# Patient Record
Sex: FEMALE | Race: BLACK OR AFRICAN AMERICAN | NOT HISPANIC OR LATINO | URBAN - METROPOLITAN AREA
[De-identification: names, ages, dates, MRNs, and addresses within clinical notes are randomized per-mention and may not be internally consistent; named-entity substitution may affect disease eponyms.]

---

## 2022-08-19 ENCOUNTER — EMERGENCY (EMERGENCY)
Facility: HOSPITAL | Age: 57
LOS: 1 days | Discharge: HOME | End: 2022-08-19
Attending: EMERGENCY MEDICINE | Admitting: EMERGENCY MEDICINE

## 2022-08-19 VITALS
OXYGEN SATURATION: 96 % | DIASTOLIC BLOOD PRESSURE: 63 MMHG | SYSTOLIC BLOOD PRESSURE: 135 MMHG | TEMPERATURE: 98 F | HEART RATE: 110 BPM | RESPIRATION RATE: 18 BRPM

## 2022-08-19 PROCEDURE — 99285 EMERGENCY DEPT VISIT HI MDM: CPT

## 2022-08-19 NOTE — ED PROVIDER NOTE - PATIENT PORTAL LINK FT
You can access the FollowMyHealth Patient Portal offered by [facility] by registering at the following website: [email or web address]. By joining [employer]’s FollowMyHealth portal, you will also be able to view your health information using other applications (apps) compatible with our system.

## 2022-08-19 NOTE — ED PROVIDER NOTE - PROGRESS NOTE DETAILS
Valentino QUINTERO: Pt signed out to me.  CTA neg for pe.  She is HD stable, comfortable, nontoxic appearing.  LL o pacity noted on CT scan - she does report fever and chills at home.  Tested neg for covid today.  Will start oral abx, dc home to cont course and outpatient pmd follow-up.

## 2022-08-19 NOTE — ED ADULT NURSE NOTE - OBJECTIVE STATEMENT
Patient has c/o pain in her right rib to her back when she breathes. It comes and goes. Pt went to urgent care and was told that she had one leg larger than the other.  PT A&OX4.  No distress noted.  Denies CP, no SOB noted.  Breathing non-labored.  PT ambulatory, able to move all extremities.  Skin intact.  Labs sent as ordered.  Right 20G IVL in place and tolerating well. CT Scan and BLE doppler ordered.  Will continue to monitor.

## 2022-08-19 NOTE — ED PROVIDER NOTE - CLINICAL SUMMARY MEDICAL DECISION MAKING FREE TEXT BOX
Pt is a 57 y/o female p/w pleuritic chest pain and slight swelling to LE.  Will order CTA to r/o PE, and U/S to r/o DVT.  Will order labs including CBC, CMP, and troponin.  Will reassess.

## 2022-08-19 NOTE — ED PROVIDER NOTE - NSFOLLOWUPINSTRUCTIONS_ED_ALL_ED_FT
You have pneumonia.  You were started on antibiotics.  Continue to take azithromycin 250mg once a day for the next 4 days.    Drink plenty of fluids.  You can take ibuprofen 600mg every 6 hours or Tylenol 650mg every 4 hours as needed for pain or fever.  Follow-up with your PMD within 1 week.  Return to the emergency department for any new or worsening symptoms.

## 2022-08-19 NOTE — ED PROVIDER NOTE - OBJECTIVE STATEMENT
Pt is a 57 y/o female w/ a PMH significant for HTN p/w chest pain for last 3 days. States pain is localized to right lower chest, associated with breathing, intermittent in nature. Denies SOB, nausea, diaphoresis, fever, cough. Report no similar prior episodes.  Of note, Pt admits to going to Urgentcare prior to this ED admission; Provider at Urgentcare saw leg was asymmetrically swollen and Pt w/ tachycardia, referred Pt for this visit to r/o PE.  Pt denies recent travel.

## 2022-08-19 NOTE — ED PROVIDER NOTE - NS_ ATTENDINGSCRIBEDETAILS _ED_A_ED_FT
The scribe's documentation has been prepared under my direction and personally reviewed by me in its entirety. I confirm that the note above accurately reflects all work, treatment, procedures, and medical decision making performed by me (Dr. Cade).

## 2022-08-19 NOTE — ED PROVIDER NOTE - CPE EDP CARDIAC NORM
Spoke with [employer], they are still reviewing free drug application. They are verifying insurance denials. They have everything they need to do this and should have a determination within a week.   - - -

## 2022-08-19 NOTE — ED ADULT TRIAGE NOTE - CHIEF COMPLAINT QUOTE
Patient has c/o pain in her right rib to her back when she breathes. It comes and goes. Pt went to urgent care and was told that she had one leg larger than the other.

## 2022-08-20 VITALS
DIASTOLIC BLOOD PRESSURE: 66 MMHG | RESPIRATION RATE: 16 BRPM | OXYGEN SATURATION: 96 % | SYSTOLIC BLOOD PRESSURE: 130 MMHG | HEART RATE: 92 BPM | TEMPERATURE: 99 F

## 2022-08-20 LAB
ALBUMIN SERPL ELPH-MCNC: 3.9 G/DL — SIGNIFICANT CHANGE UP (ref 3.3–5)
ALP SERPL-CCNC: 113 U/L — SIGNIFICANT CHANGE UP (ref 40–120)
ALT FLD-CCNC: 20 U/L — SIGNIFICANT CHANGE UP (ref 4–33)
ANION GAP SERPL CALC-SCNC: 13 MMOL/L — SIGNIFICANT CHANGE UP (ref 7–14)
AST SERPL-CCNC: 18 U/L — SIGNIFICANT CHANGE UP (ref 4–32)
BASOPHILS # BLD AUTO: 0.04 K/UL — SIGNIFICANT CHANGE UP (ref 0–0.2)
BASOPHILS NFR BLD AUTO: 0.5 % — SIGNIFICANT CHANGE UP (ref 0–2)
BILIRUB SERPL-MCNC: 0.3 MG/DL — SIGNIFICANT CHANGE UP (ref 0.2–1.2)
BUN SERPL-MCNC: 10 MG/DL — SIGNIFICANT CHANGE UP (ref 7–23)
CALCIUM SERPL-MCNC: 9.2 MG/DL — SIGNIFICANT CHANGE UP (ref 8.4–10.5)
CHLORIDE SERPL-SCNC: 96 MMOL/L — LOW (ref 98–107)
CO2 SERPL-SCNC: 27 MMOL/L — SIGNIFICANT CHANGE UP (ref 22–31)
CREAT SERPL-MCNC: 0.96 MG/DL — SIGNIFICANT CHANGE UP (ref 0.5–1.3)
EGFR: 69 ML/MIN/1.73M2 — SIGNIFICANT CHANGE UP
EOSINOPHIL # BLD AUTO: 0.13 K/UL — SIGNIFICANT CHANGE UP (ref 0–0.5)
EOSINOPHIL NFR BLD AUTO: 1.7 % — SIGNIFICANT CHANGE UP (ref 0–6)
GLUCOSE SERPL-MCNC: 101 MG/DL — HIGH (ref 70–99)
HCT VFR BLD CALC: 36.5 % — SIGNIFICANT CHANGE UP (ref 34.5–45)
HGB BLD-MCNC: 11.1 G/DL — LOW (ref 11.5–15.5)
IANC: 5.22 K/UL — SIGNIFICANT CHANGE UP (ref 1.8–7.4)
IMM GRANULOCYTES NFR BLD AUTO: 0.4 % — SIGNIFICANT CHANGE UP (ref 0–1.5)
LYMPHOCYTES # BLD AUTO: 1.65 K/UL — SIGNIFICANT CHANGE UP (ref 1–3.3)
LYMPHOCYTES # BLD AUTO: 21 % — SIGNIFICANT CHANGE UP (ref 13–44)
MCHC RBC-ENTMCNC: 26.2 PG — LOW (ref 27–34)
MCHC RBC-ENTMCNC: 30.4 GM/DL — LOW (ref 32–36)
MCV RBC AUTO: 86.1 FL — SIGNIFICANT CHANGE UP (ref 80–100)
MONOCYTES # BLD AUTO: 0.78 K/UL — SIGNIFICANT CHANGE UP (ref 0–0.9)
MONOCYTES NFR BLD AUTO: 9.9 % — SIGNIFICANT CHANGE UP (ref 2–14)
NEUTROPHILS # BLD AUTO: 5.22 K/UL — SIGNIFICANT CHANGE UP (ref 1.8–7.4)
NEUTROPHILS NFR BLD AUTO: 66.5 % — SIGNIFICANT CHANGE UP (ref 43–77)
NRBC # BLD: 0 /100 WBCS — SIGNIFICANT CHANGE UP (ref 0–0)
NRBC # FLD: 0 K/UL — SIGNIFICANT CHANGE UP (ref 0–0)
PLATELET # BLD AUTO: 351 K/UL — SIGNIFICANT CHANGE UP (ref 150–400)
POTASSIUM SERPL-MCNC: 3.1 MMOL/L — LOW (ref 3.5–5.3)
POTASSIUM SERPL-SCNC: 3.1 MMOL/L — LOW (ref 3.5–5.3)
PROT SERPL-MCNC: 7.9 G/DL — SIGNIFICANT CHANGE UP (ref 6–8.3)
RBC # BLD: 4.24 M/UL — SIGNIFICANT CHANGE UP (ref 3.8–5.2)
RBC # FLD: 17.2 % — HIGH (ref 10.3–14.5)
SODIUM SERPL-SCNC: 136 MMOL/L — SIGNIFICANT CHANGE UP (ref 135–145)
TROPONIN T, HIGH SENSITIVITY RESULT: <6 NG/L — SIGNIFICANT CHANGE UP
WBC # BLD: 7.85 K/UL — SIGNIFICANT CHANGE UP (ref 3.8–10.5)
WBC # FLD AUTO: 7.85 K/UL — SIGNIFICANT CHANGE UP (ref 3.8–10.5)

## 2022-08-20 PROCEDURE — 93971 EXTREMITY STUDY: CPT | Mod: 26,LT

## 2022-08-20 PROCEDURE — 71275 CT ANGIOGRAPHY CHEST: CPT | Mod: 26,MA

## 2022-08-20 RX ORDER — POTASSIUM CHLORIDE 20 MEQ
40 PACKET (EA) ORAL ONCE
Refills: 0 | Status: COMPLETED | OUTPATIENT
Start: 2022-08-20 | End: 2022-08-20

## 2022-08-20 RX ORDER — AZITHROMYCIN 500 MG/1
1 TABLET, FILM COATED ORAL
Qty: 4 | Refills: 0
Start: 2022-08-20 | End: 2022-08-23

## 2022-08-20 RX ORDER — AZITHROMYCIN 500 MG/1
500 TABLET, FILM COATED ORAL ONCE
Refills: 0 | Status: COMPLETED | OUTPATIENT
Start: 2022-08-20 | End: 2022-08-20

## 2022-08-20 RX ADMIN — Medication 40 MILLIEQUIVALENT(S): at 01:35

## 2022-08-20 RX ADMIN — AZITHROMYCIN 500 MILLIGRAM(S): 500 TABLET, FILM COATED ORAL at 04:45

## 2023-03-04 ENCOUNTER — INPATIENT (INPATIENT)
Facility: HOSPITAL | Age: 58
LOS: 0 days | Discharge: HOME | End: 2023-03-05
Attending: INTERNAL MEDICINE | Admitting: INTERNAL MEDICINE
Payer: COMMERCIAL

## 2023-03-04 VITALS
TEMPERATURE: 98 F | HEART RATE: 89 BPM | SYSTOLIC BLOOD PRESSURE: 200 MMHG | OXYGEN SATURATION: 98 % | DIASTOLIC BLOOD PRESSURE: 117 MMHG | RESPIRATION RATE: 25 BRPM

## 2023-03-04 LAB
ALBUMIN SERPL ELPH-MCNC: 4.1 G/DL — SIGNIFICANT CHANGE UP (ref 3.3–5)
ALP SERPL-CCNC: 101 U/L — SIGNIFICANT CHANGE UP (ref 40–120)
ALT FLD-CCNC: 18 U/L — SIGNIFICANT CHANGE UP (ref 4–33)
ANION GAP SERPL CALC-SCNC: 12 MMOL/L — SIGNIFICANT CHANGE UP (ref 7–14)
AST SERPL-CCNC: 15 U/L — SIGNIFICANT CHANGE UP (ref 4–32)
BASOPHILS # BLD AUTO: 0.02 K/UL — SIGNIFICANT CHANGE UP (ref 0–0.2)
BASOPHILS NFR BLD AUTO: 0.4 % — SIGNIFICANT CHANGE UP (ref 0–2)
BILIRUB SERPL-MCNC: <0.2 MG/DL — SIGNIFICANT CHANGE UP (ref 0.2–1.2)
BUN SERPL-MCNC: 18 MG/DL — SIGNIFICANT CHANGE UP (ref 7–23)
CALCIUM SERPL-MCNC: 9.5 MG/DL — SIGNIFICANT CHANGE UP (ref 8.4–10.5)
CHLORIDE SERPL-SCNC: 102 MMOL/L — SIGNIFICANT CHANGE UP (ref 98–107)
CO2 SERPL-SCNC: 26 MMOL/L — SIGNIFICANT CHANGE UP (ref 22–31)
CREAT SERPL-MCNC: 0.94 MG/DL — SIGNIFICANT CHANGE UP (ref 0.5–1.3)
EGFR: 71 ML/MIN/1.73M2 — SIGNIFICANT CHANGE UP
EOSINOPHIL # BLD AUTO: 0.11 K/UL — SIGNIFICANT CHANGE UP (ref 0–0.5)
EOSINOPHIL NFR BLD AUTO: 2 % — SIGNIFICANT CHANGE UP (ref 0–6)
GLUCOSE SERPL-MCNC: 98 MG/DL — SIGNIFICANT CHANGE UP (ref 70–99)
HCT VFR BLD CALC: 40.6 % — SIGNIFICANT CHANGE UP (ref 34.5–45)
HGB BLD-MCNC: 12.4 G/DL — SIGNIFICANT CHANGE UP (ref 11.5–15.5)
IANC: 2.96 K/UL — SIGNIFICANT CHANGE UP (ref 1.8–7.4)
IMM GRANULOCYTES NFR BLD AUTO: 0.2 % — SIGNIFICANT CHANGE UP (ref 0–0.9)
LYMPHOCYTES # BLD AUTO: 2.07 K/UL — SIGNIFICANT CHANGE UP (ref 1–3.3)
LYMPHOCYTES # BLD AUTO: 37.4 % — SIGNIFICANT CHANGE UP (ref 13–44)
MAGNESIUM SERPL-MCNC: 2.1 MG/DL — SIGNIFICANT CHANGE UP (ref 1.6–2.6)
MCHC RBC-ENTMCNC: 26.7 PG — LOW (ref 27–34)
MCHC RBC-ENTMCNC: 30.5 GM/DL — LOW (ref 32–36)
MCV RBC AUTO: 87.3 FL — SIGNIFICANT CHANGE UP (ref 80–100)
MONOCYTES # BLD AUTO: 0.36 K/UL — SIGNIFICANT CHANGE UP (ref 0–0.9)
MONOCYTES NFR BLD AUTO: 6.5 % — SIGNIFICANT CHANGE UP (ref 2–14)
NEUTROPHILS # BLD AUTO: 2.96 K/UL — SIGNIFICANT CHANGE UP (ref 1.8–7.4)
NEUTROPHILS NFR BLD AUTO: 53.5 % — SIGNIFICANT CHANGE UP (ref 43–77)
NRBC # BLD: 0 /100 WBCS — SIGNIFICANT CHANGE UP (ref 0–0)
NRBC # FLD: 0 K/UL — SIGNIFICANT CHANGE UP (ref 0–0)
NT-PROBNP SERPL-SCNC: 74 PG/ML — SIGNIFICANT CHANGE UP
PLATELET # BLD AUTO: 328 K/UL — SIGNIFICANT CHANGE UP (ref 150–400)
POTASSIUM SERPL-MCNC: 3.4 MMOL/L — LOW (ref 3.5–5.3)
POTASSIUM SERPL-SCNC: 3.4 MMOL/L — LOW (ref 3.5–5.3)
PROT SERPL-MCNC: 8.2 G/DL — SIGNIFICANT CHANGE UP (ref 6–8.3)
RBC # BLD: 4.65 M/UL — SIGNIFICANT CHANGE UP (ref 3.8–5.2)
RBC # FLD: 16.4 % — HIGH (ref 10.3–14.5)
SODIUM SERPL-SCNC: 140 MMOL/L — SIGNIFICANT CHANGE UP (ref 135–145)
TROPONIN T, HIGH SENSITIVITY RESULT: <6 NG/L — SIGNIFICANT CHANGE UP
WBC # BLD: 5.53 K/UL — SIGNIFICANT CHANGE UP (ref 3.8–10.5)
WBC # FLD AUTO: 5.53 K/UL — SIGNIFICANT CHANGE UP (ref 3.8–10.5)

## 2023-03-04 PROCEDURE — 71045 X-RAY EXAM CHEST 1 VIEW: CPT | Mod: 26

## 2023-03-04 PROCEDURE — 99285 EMERGENCY DEPT VISIT HI MDM: CPT

## 2023-03-04 RX ORDER — NIFEDIPINE 30 MG
30 TABLET, EXTENDED RELEASE 24 HR ORAL ONCE
Refills: 0 | Status: COMPLETED | OUTPATIENT
Start: 2023-03-04 | End: 2023-03-04

## 2023-03-04 RX ORDER — ASPIRIN/CALCIUM CARB/MAGNESIUM 324 MG
162 TABLET ORAL ONCE
Refills: 0 | Status: COMPLETED | OUTPATIENT
Start: 2023-03-04 | End: 2023-03-04

## 2023-03-04 RX ADMIN — Medication 162 MILLIGRAM(S): at 22:18

## 2023-03-04 RX ADMIN — Medication 30 MILLIGRAM(S): at 22:19

## 2023-03-04 NOTE — ED ADULT TRIAGE NOTE - CHIEF COMPLAINT QUOTE
alert oriented c/o headache CP palpitations had E[facility]  Blood Pressure 200/117  PMHx HTN high cholesterol

## 2023-03-04 NOTE — ED PROVIDER NOTE - OBJECTIVE STATEMENT
Patient is a 57 year-old-female with history of HTN presents with chest pain and shortness of breath. Patient states that around 4pm when she started working, she noticed some tightness in the midsternal area with some associated shortness of breath and headache. Went to urgent care and found to have elevated BP and thus presented here. Currently reports that her symptoms has resolved, not currently having any chest pain or shortness of breath. Denies fever at home, nausea, vomiting, abdominal pain, urinary or bowel complaints.

## 2023-03-04 NOTE — ED PROVIDER NOTE - CLINICAL SUMMARY MEDICAL DECISION MAKING FREE TEXT BOX
Patient is a 57 year-old-female with history of HTN presents with chest pain and shortness of breath. Currently asymptomatic. Patient is currently afebrile and hemodynamically stable. BP elevated to 200/117. Concerning for hypertensive urgency vs ACS. Patient does not have a cardiologist and never had cardiac workup. Will obtain labs including trop/bnp, ECG and CXR. Will give aspirin and home nifidepine. Likely require admission for further management.

## 2023-03-04 NOTE — ED PROVIDER NOTE - ATTENDING CONTRIBUTION TO CARE
Deven Montes DO:  patient seen and evaluated with the resident.  I was present for key portions of the History & Physical, and I agree with the Impression & Plan. 56 yo f pmh htn, pw cp. Reports 1 day of symptoms.  States has anterior chest pressure, exertional, resolved prior to arrival.  Has had symptoms in the past.  No recent cardiac work-up.  Also PLAZA.  Denies leg swelling.  Denies recent travel.  Denies N/V, diaphoresis.  Patient arrives HDS, well-appearing.  BP elevated.  Will control BP, assess for ACS.  Low threshold for CDU for echo.

## 2023-03-04 NOTE — CONSULT NOTE ADULT - ASSESSMENT
57 year old female, with past history significant for Hypertension, and Smoking, presented to the ED secondary to chest pain and shortness of breath.  Diagnosed with Chest Pain, Shortness of breath, Headache and Elevated Blood Pressure Reading in the ED.    Problem/Plan - 1:  ·  Problem: Chest tightness.   ·  Plan: - sudden onset  & w/ shortness of breath, lightheadedness, w/ report of significantly elevated BP (BP = 200/117 in ED)  - history of smoking; quit prior to 2013.  FH of multiple members w/ HTN  - in the ED in 08/2022 for chest pain and negative findings for PE (CTA and US)  - likelihood that sleep deprivation, inadequate rest is impacting current condition   - troponin = < 6 --> < 6.  Pro-BNP = 74 (pro-BNP may be falsely low in the context of obesity)  - initial ECG not found in chart; will reorder (please f/u)  - CXR w/o any acute untoward findings  - s/p aspirin 162 mg and nifedipine XL 30 mg in the ED  - continuing w/ PTA nifedipine 30 mg, valsartan 160 mg.  Rescheduling HCTZ for tomorrow AM due to dehydration   - adding atorvastatin 20 mg, aspirin 81 mg, metoprolol 25 mg  - f/u TTE   -Ischemic Work-up NST      Problem/Plan - 2:  ·  Problem: Hypertensive urgency.   ·  Plan: - BP initially = 200/117  - Troponin = <6 --> < 6.  Pro-BNP = 74.  No signs of renal compromise.  No mental status changes  - medications as above  - modify therapy, as needed  - continues on Telemetry.    Problem/Plan - 3:  ·  Problem: Dehydration.   ·  Plan: - dry oral mucosa  - although pro-BNP = 74, in the context of obesity, unlikely falsely low  - prescribed LR one liter over 2 hours  - f/u electrolytes  - evaluate for improvement  - oral hydration, as tolerated.    Problem/Plan - 4:  ·  Problem: Hypokalemia.   ·  Plan: - potassium = 3.4  (magnesium w/in acceptable range at 2.10)  - supplemented w/ 40 meq, followed by 20 meq after 4 hours  - f/u for resolution.    Problem/Plan - 5:  ·  Problem: Tiredness.   ·  Plan: - very tired, per patient, in the context of working 2 jobs (full time)  - reports sleep deprivation due to same  - likely contributing to current medical condition  - f/u TSH level  - needs to modify schedule as much as possible to achieve adequate sleep/rest.    Problem/Plan - 6:  ·  Problem: Prophylactic measure.   ·  Plan: - ambulatory  - on aspirin  - if becomes recumbent, would start pharmacological DVT prophylaxis.

## 2023-03-04 NOTE — ED ADULT NURSE REASSESSMENT NOTE - NS ED NURSE REASSESS COMMENT FT1
repeat BP improved. pt without complaints. rsr on monitor without ectopy.  will continue to monitor.

## 2023-03-04 NOTE — CONSULT NOTE ADULT - SUBJECTIVE AND OBJECTIVE BOX
PATIENT SEEN AND EXAMINED ON :-03/04/2023  DATE OF SERVICE:   03/04/2023          Interim events noted,Labs ,Radiological studies and Cardiology tests reviewed .    History of Present Illness:  Reason for Admission: Chest pain  History of Present Illness:   57 year old female, with past history significant for Hypertension, and Smoking, presented to the ED secondary to chest pain and shortness of breath.  Seen and evaluated at bedside; lying right decubitus in bed in NAD.  Appears tired.  Patient endorses experiencing significant midsternal chest heaviness and shortness of breath (had to incorporate extra effort to breathe) at around 4:00 PM, prior to starting work.  Per documentation, patient went to the urgent care center and evaluation noted elevated blood pressure so patient was sent to the ED.  Patient complains of sf significant tiredness - related to working two (2) jobs.  No report of edema,  Has lightheadedness, headache (sometimes at the occiput and at other times frontal - involving the eyes).  Relates nausea, possibly related to having not eaten until ~ 2:00 PM on the day of coming to the ED.  Comments on inadequate sleep/rest due to working 2 full time jobs.    Vital signs upon ED presentation as follows: BP = 200/117, HR = 89, RR = 25, T = 36.7 C (98 F), O2 Sat = 98% on RA.  Diagnosed with Chest Pain, Shortness of breath, Headache and Elevated Blood Pressure Reading in the ED.    Review of Systems:  Review of Systems: REVIEW OF SYSTEMS:    CONSTITUTIONAL: Significant tiredness.  (+) headache (occipital and frontal at times).  No weakness, fever, chills or sweating  EYES/ENT: No visual changes.  No dysphagia  NECK: No pain or stiffness  RESPIRATORY: No cough or hemoptysis.  (+) shortness of breath  CARDIOVASCULAR: Midsternal chest tightness.   No palpitations.  No lower extremity edema  GASTROINTESTINAL: No epigastric or abdominal pain. No nausea, vomiting or hematemesis.  No diarrhea or constipation. No melena or hematochezia.  GENITOURINARY: No dysuria, frequency or hematuria  MUSCULOSKELETAL: No joint pain, swelling, decreased ROM, erythema, warmth  NEUROLOGICAL: No numbness or weakness  PSYCHIATRY: Sleep deprivation.  No anxiety, or depression.  SKIN: No itching, burning, rashes, or lesions   All other review of systems is negative unless indicated above.      Allergies and Intolerances:        Allergies:  	No Known Allergies:     Home Medications:   * Patient Currently Takes Medications as of 20-Aug-2022 04:36 documented in Structured Notes  · 	azithromycin 250 mg oral tablet: 1 tab(s) orally once a day     Patient History:   Past Medical, Past Surgical, and Family History:  PAST MEDICAL HISTORY:  History of smoking     HTN (hypertension).     PAST SURGICAL HISTORY:  No significant past surgical history.     FAMILY HISTORY:  Mother  Still living? Unknown  Family history of diabetes mellitus (DM), Age at diagnosis: Age Unknown  Family history of hypertension, Age at diagnosis: Age Unknown    Sibling  Still living? Unknown  Family history of hypertension, Age at diagnosis: Age Unknown.        Physical Exam: Vital Signs Last 24 Hrs  T(C): 36.7 (04 Mar 2023 20:52), Max: 36.7 (04 Mar 2023 20:52)  T(F): 98 (04 Mar 2023 20:52), Max: 98 (04 Mar 2023 20:52)  HR: 72 (05 Mar 2023 00:15) (72 - 89)  BP: 149/83 (05 Mar 2023 00:15) (143/93 - 200/117)  BP(mean): --  RR: 17 (05 Mar 2023 00:15) (16 - 25)  SpO2: 100% (05 Mar 2023 00:15) (97% - 100%)    Parameters below as of 05 Mar 2023 00:15  Patient On (Oxygen Delivery Method): room air      PHYSICAL EXAM:  General: No acute distress BMI-34  HEENT: EOMI, PERRL  Neck: Supple, [ ] JVD  Lungs: Equal air entry bilaterally; [ ] rales [ ] wheezing [ ] rhonchi  Heart: Regular rate and rhythm; [x ] murmur   2/6 [ x] systolic [ ] diastolic [ ] radiation[ ] rubs [ ]  gallops  Abdomen: Nontender, bowel sounds present  Extremities: No clubbing, cyanosis, [ ] edema [ ]Pulses  equal and intact  Nervous system:  Alert & Oriented X3, no focal deficits  Psychiatric: Normal affect  Skin: No rashes or lesions        Labs and Results:  Labs, Radiology, Cardiology, and Other Results: LAB-WORK/STUDIES:                          12.4   5.53  )-----------( 328      ( 04 Mar 2023 22:00 )             40.6     04 Mar 2023 22:00    140    |  102    |  18     ----------------------------<  98     3.4     |  26     |  0.94     Ca    9.5        04 Mar 2023 22:00  Mg     2.10      04 Mar 2023 22:00    TPro  8.2    /  Alb  4.1    /  TBili  <0.2   /  DBili  x      /  AST  15     /  ALT  18     /  AlkPhos  101    04 Mar 2023 22:00    LIVER FUNCTIONS - ( 04 Mar 2023 22:00 )  Alb: 4.1 g/dL / Pro: 8.2 g/dL / ALK PHOS: 101 U/L / ALT: 18 U/L / AST: 15 U/L / GGT: x          ECG:  Normal sinus rhythm at 75 BPM  Possible Left atrial enlargement  Left ventricular hypertrophy  Abnormal ECG    Xray Chest 1 View- PORTABLE-Urgent (03.04.23 @ 22:51) >  FINDINGS:  The heart is normal in size.  The lungs are clear.  There is no pneumothorax or pleural effusion.    IMPRESSION:  Clear lungs.

## 2023-03-04 NOTE — ED PROVIDER NOTE - IV ALTEPLASE ADMIN OUTSIDE HIDDEN
Patient informed of negative covid and to continue quarantine as directed. Verbalized understanding.   
show

## 2023-03-04 NOTE — CONSULT NOTE ADULT - TIME BILLING
- Review of records, telemetry, vital signs and daily labs.   - General and cardiovascular physical examination.  - Generation of cardiovascular treatment plan.  - Coordination of care.  Attending supervision statement: I have personally seen and examined the patient.    I reviewed the overnight course of events on the unit, re-confirming the patient history.  I have reciewed interim Lab results,Radiology and Cardiac studies.   I discussed the care with the patient and availavle  family.   Differential diagnosis and plan of care discussed with patient after the evaluation.   Advanced care planning was discussed .    Advanced care planning forms were reviewed and discussed.    Risks, benefits and alternatives of scheduled procedures were discussed in detail and all questions were answered.   35 minutes spent on total encounter of which more than fifty percent of the encounter was spent counseling and/or coordinating care by the attending physician.  [facility] Code [id number]             Patient was seen and examined by me on 03/04/2023,interim events noted,labs and radiology studies reviewed.  Braxton Perry MD,FACC.  [address] [place], [place] [id number].  [phone]

## 2023-03-04 NOTE — ED ADULT NURSE NOTE - OBJECTIVE STATEMENT
Pt presents to ED c/o having an episode of CP / pressure w SOB. Went to urgent care and was referred to ED. Is compliant w her anti HTN meds- procardia XL. No n/v. fever, chills or other complaints. Has noted CP w exertion recently. respirations even and unlabored. skin warm and dry. rsr on monitor, no ectopy. BP improved since triage. iv placed, labs drawn and sent, 20 ga left ac. meds given as per orders. will continue to monitor awaiting results.

## 2023-03-04 NOTE — ED PROVIDER NOTE - CARE PLAN
Principal Discharge DX:	Chest pain  Secondary Diagnosis:	SOB (shortness of breath)  Secondary Diagnosis:	Headache  Secondary Diagnosis:	Elevated blood pressure reading   1

## 2023-03-05 VITALS
RESPIRATION RATE: 20 BRPM | OXYGEN SATURATION: 97 % | TEMPERATURE: 98 F | DIASTOLIC BLOOD PRESSURE: 53 MMHG | HEART RATE: 79 BPM | SYSTOLIC BLOOD PRESSURE: 101 MMHG

## 2023-03-05 DIAGNOSIS — R07.89 OTHER CHEST PAIN: ICD-10-CM

## 2023-03-05 DIAGNOSIS — R07.9 CHEST PAIN, UNSPECIFIED: ICD-10-CM

## 2023-03-05 DIAGNOSIS — E86.0 DEHYDRATION: ICD-10-CM

## 2023-03-05 DIAGNOSIS — I16.0 HYPERTENSIVE URGENCY: ICD-10-CM

## 2023-03-05 DIAGNOSIS — Z29.9 ENCOUNTER FOR PROPHYLACTIC MEASURES, UNSPECIFIED: ICD-10-CM

## 2023-03-05 DIAGNOSIS — E87.6 HYPOKALEMIA: ICD-10-CM

## 2023-03-05 DIAGNOSIS — R53.83 OTHER FATIGUE: ICD-10-CM

## 2023-03-05 PROBLEM — I10 ESSENTIAL (PRIMARY) HYPERTENSION: Chronic | Status: ACTIVE | Noted: 2022-08-20

## 2023-03-05 LAB
24R-OH-CALCIDIOL SERPL-MCNC: 11.6 NG/ML — LOW (ref 30–80)
A1C WITH ESTIMATED AVERAGE GLUCOSE RESULT: 5.9 % — HIGH (ref 4–5.6)
ANION GAP SERPL CALC-SCNC: 13 MMOL/L — SIGNIFICANT CHANGE UP (ref 7–14)
B PERT DNA SPEC QL NAA+PROBE: SIGNIFICANT CHANGE UP
B PERT+PARAPERT DNA PNL SPEC NAA+PROBE: SIGNIFICANT CHANGE UP
BORDETELLA PARAPERTUSSIS (RAPRVP): SIGNIFICANT CHANGE UP
BUN SERPL-MCNC: 14 MG/DL — SIGNIFICANT CHANGE UP (ref 7–23)
C PNEUM DNA SPEC QL NAA+PROBE: SIGNIFICANT CHANGE UP
CALCIUM SERPL-MCNC: 9.4 MG/DL — SIGNIFICANT CHANGE UP (ref 8.4–10.5)
CHLORIDE SERPL-SCNC: 100 MMOL/L — SIGNIFICANT CHANGE UP (ref 98–107)
CHOLEST SERPL-MCNC: 196 MG/DL — SIGNIFICANT CHANGE UP
CO2 SERPL-SCNC: 25 MMOL/L — SIGNIFICANT CHANGE UP (ref 22–31)
CREAT SERPL-MCNC: 0.85 MG/DL — SIGNIFICANT CHANGE UP (ref 0.5–1.3)
EGFR: 80 ML/MIN/1.73M2 — SIGNIFICANT CHANGE UP
ESTIMATED AVERAGE GLUCOSE: 123 — SIGNIFICANT CHANGE UP
FLUAV SUBTYP SPEC NAA+PROBE: SIGNIFICANT CHANGE UP
FLUBV RNA SPEC QL NAA+PROBE: SIGNIFICANT CHANGE UP
GLUCOSE SERPL-MCNC: 96 MG/DL — SIGNIFICANT CHANGE UP (ref 70–99)
HADV DNA SPEC QL NAA+PROBE: SIGNIFICANT CHANGE UP
HCOV 229E RNA SPEC QL NAA+PROBE: SIGNIFICANT CHANGE UP
HCOV HKU1 RNA SPEC QL NAA+PROBE: SIGNIFICANT CHANGE UP
HCOV NL63 RNA SPEC QL NAA+PROBE: SIGNIFICANT CHANGE UP
HCOV OC43 RNA SPEC QL NAA+PROBE: SIGNIFICANT CHANGE UP
HCT VFR BLD CALC: 39.9 % — SIGNIFICANT CHANGE UP (ref 34.5–45)
HCV AB S/CO SERPL IA: 0.52 S/CO — SIGNIFICANT CHANGE UP (ref 0–0.99)
HCV AB SERPL-IMP: SIGNIFICANT CHANGE UP
HDLC SERPL-MCNC: 51 MG/DL — SIGNIFICANT CHANGE UP
HGB BLD-MCNC: 12 G/DL — SIGNIFICANT CHANGE UP (ref 11.5–15.5)
HMPV RNA SPEC QL NAA+PROBE: SIGNIFICANT CHANGE UP
HPIV1 RNA SPEC QL NAA+PROBE: SIGNIFICANT CHANGE UP
HPIV2 RNA SPEC QL NAA+PROBE: SIGNIFICANT CHANGE UP
HPIV3 RNA SPEC QL NAA+PROBE: SIGNIFICANT CHANGE UP
HPIV4 RNA SPEC QL NAA+PROBE: SIGNIFICANT CHANGE UP
LIPID PNL WITH DIRECT LDL SERPL: 123 MG/DL — HIGH
M PNEUMO DNA SPEC QL NAA+PROBE: SIGNIFICANT CHANGE UP
MAGNESIUM SERPL-MCNC: 2.2 MG/DL — SIGNIFICANT CHANGE UP (ref 1.6–2.6)
MCHC RBC-ENTMCNC: 26.8 PG — LOW (ref 27–34)
MCHC RBC-ENTMCNC: 30.1 GM/DL — LOW (ref 32–36)
MCV RBC AUTO: 89.3 FL — SIGNIFICANT CHANGE UP (ref 80–100)
NON HDL CHOLESTEROL: 145 MG/DL — HIGH
NRBC # BLD: 0 /100 WBCS — SIGNIFICANT CHANGE UP (ref 0–0)
NRBC # FLD: 0 K/UL — SIGNIFICANT CHANGE UP (ref 0–0)
PHOSPHATE SERPL-MCNC: 4.1 MG/DL — SIGNIFICANT CHANGE UP (ref 2.5–4.5)
PLATELET # BLD AUTO: 290 K/UL — SIGNIFICANT CHANGE UP (ref 150–400)
POTASSIUM SERPL-MCNC: 3.1 MMOL/L — LOW (ref 3.5–5.3)
POTASSIUM SERPL-SCNC: 3.1 MMOL/L — LOW (ref 3.5–5.3)
RAPID RVP RESULT: SIGNIFICANT CHANGE UP
RBC # BLD: 4.47 M/UL — SIGNIFICANT CHANGE UP (ref 3.8–5.2)
RBC # FLD: 16.6 % — HIGH (ref 10.3–14.5)
RSV RNA SPEC QL NAA+PROBE: SIGNIFICANT CHANGE UP
RV+EV RNA SPEC QL NAA+PROBE: SIGNIFICANT CHANGE UP
SARS-COV-2 RNA SPEC QL NAA+PROBE: SIGNIFICANT CHANGE UP
SODIUM SERPL-SCNC: 138 MMOL/L — SIGNIFICANT CHANGE UP (ref 135–145)
TRIGL SERPL-MCNC: 109 MG/DL — SIGNIFICANT CHANGE UP
TROPONIN T, HIGH SENSITIVITY RESULT: <6 NG/L — SIGNIFICANT CHANGE UP
TROPONIN T, HIGH SENSITIVITY RESULT: <6 NG/L — SIGNIFICANT CHANGE UP
TSH SERPL-MCNC: 1.38 UIU/ML — SIGNIFICANT CHANGE UP (ref 0.27–4.2)
WBC # BLD: 4.97 K/UL — SIGNIFICANT CHANGE UP (ref 3.8–10.5)
WBC # FLD AUTO: 4.97 K/UL — SIGNIFICANT CHANGE UP (ref 3.8–10.5)

## 2023-03-05 PROCEDURE — 93306 TTE W/DOPPLER COMPLETE: CPT | Mod: 26

## 2023-03-05 PROCEDURE — 93016 CV STRESS TEST SUPVJ ONLY: CPT | Mod: GC

## 2023-03-05 PROCEDURE — 93018 CV STRESS TEST I&R ONLY: CPT | Mod: GC

## 2023-03-05 PROCEDURE — 78452 HT MUSCLE IMAGE SPECT MULT: CPT | Mod: 26

## 2023-03-05 PROCEDURE — 99223 1ST HOSP IP/OBS HIGH 75: CPT

## 2023-03-05 RX ORDER — NIFEDIPINE 30 MG
30 TABLET, EXTENDED RELEASE 24 HR ORAL DAILY
Refills: 0 | Status: DISCONTINUED | OUTPATIENT
Start: 2023-03-05 | End: 2023-03-05

## 2023-03-05 RX ORDER — ASPIRIN/CALCIUM CARB/MAGNESIUM 324 MG
81 TABLET ORAL DAILY
Refills: 0 | Status: DISCONTINUED | OUTPATIENT
Start: 2023-03-05 | End: 2023-03-05

## 2023-03-05 RX ORDER — POTASSIUM CHLORIDE 20 MEQ
40 PACKET (EA) ORAL ONCE
Refills: 0 | Status: COMPLETED | OUTPATIENT
Start: 2023-03-05 | End: 2023-03-05

## 2023-03-05 RX ORDER — SODIUM CHLORIDE 9 MG/ML
1000 INJECTION, SOLUTION INTRAVENOUS ONCE
Refills: 0 | Status: COMPLETED | OUTPATIENT
Start: 2023-03-05 | End: 2023-03-05

## 2023-03-05 RX ORDER — LANOLIN ALCOHOL/MO/W.PET/CERES
3 CREAM (GRAM) TOPICAL AT BEDTIME
Refills: 0 | Status: DISCONTINUED | OUTPATIENT
Start: 2023-03-05 | End: 2023-03-05

## 2023-03-05 RX ORDER — POTASSIUM CHLORIDE 20 MEQ
20 PACKET (EA) ORAL ONCE
Refills: 0 | Status: DISCONTINUED | OUTPATIENT
Start: 2023-03-05 | End: 2023-03-05

## 2023-03-05 RX ORDER — ERGOCALCIFEROL 1.25 MG/1
50000 CAPSULE ORAL
Refills: 0 | Status: DISCONTINUED | OUTPATIENT
Start: 2023-03-05 | End: 2023-03-05

## 2023-03-05 RX ORDER — METOPROLOL TARTRATE 50 MG
25 TABLET ORAL EVERY 12 HOURS
Refills: 0 | Status: DISCONTINUED | OUTPATIENT
Start: 2023-03-05 | End: 2023-03-05

## 2023-03-05 RX ORDER — ACETAMINOPHEN 500 MG
650 TABLET ORAL EVERY 6 HOURS
Refills: 0 | Status: DISCONTINUED | OUTPATIENT
Start: 2023-03-05 | End: 2023-03-05

## 2023-03-05 RX ORDER — ATORVASTATIN CALCIUM 80 MG/1
20 TABLET, FILM COATED ORAL AT BEDTIME
Refills: 0 | Status: DISCONTINUED | OUTPATIENT
Start: 2023-03-05 | End: 2023-03-05

## 2023-03-05 RX ORDER — VALSARTAN 80 MG/1
160 TABLET ORAL DAILY
Refills: 0 | Status: DISCONTINUED | OUTPATIENT
Start: 2023-03-05 | End: 2023-03-05

## 2023-03-05 RX ORDER — METOPROLOL TARTRATE 50 MG
1 TABLET ORAL
Qty: 60 | Refills: 0
Start: 2023-03-05 | End: 2023-04-03

## 2023-03-05 RX ORDER — NIFEDIPINE 30 MG
1 TABLET, EXTENDED RELEASE 24 HR ORAL
Qty: 0 | Refills: 0 | DISCHARGE

## 2023-03-05 RX ORDER — ERGOCALCIFEROL 1.25 MG/1
1 CAPSULE ORAL
Qty: 7 | Refills: 0
Start: 2023-03-05 | End: 2023-04-22

## 2023-03-05 RX ADMIN — VALSARTAN 160 MILLIGRAM(S): 80 TABLET ORAL at 06:26

## 2023-03-05 RX ADMIN — Medication 40 MILLIEQUIVALENT(S): at 09:56

## 2023-03-05 RX ADMIN — Medication 40 MILLIEQUIVALENT(S): at 06:30

## 2023-03-05 RX ADMIN — SODIUM CHLORIDE 500 MILLILITER(S): 9 INJECTION, SOLUTION INTRAVENOUS at 06:30

## 2023-03-05 RX ADMIN — Medication 81 MILLIGRAM(S): at 13:02

## 2023-03-05 RX ADMIN — Medication 25 MILLIGRAM(S): at 06:26

## 2023-03-05 RX ADMIN — ERGOCALCIFEROL 50000 UNIT(S): 1.25 CAPSULE ORAL at 13:35

## 2023-03-05 RX ADMIN — Medication 30 MILLIGRAM(S): at 06:26

## 2023-03-05 NOTE — H&P ADULT - PROBLEM SELECTOR PLAN 3
- dry oral mucosa  - although pro-BNP = 74, in the context of obesity, unlikely falsely low  - prescribed LR one liter over 2 hours  - f/u electrolytes  - evaluate for improvement  - oral hydration, as tolerated

## 2023-03-05 NOTE — DISCHARGE NOTE PROVIDER - NSDCCPCAREPLAN_GEN_ALL_CORE_FT
PRINCIPAL DISCHARGE DIAGNOSIS  Diagnosis: Chest pain  Assessment and Plan of Treatment: Chest pain is now resolved. This is likely due to uncontrolled hypertension. You had a stress test and echocardiogram done which were both normal. Please follow up with Dr Perry in 1 week for further care, call to make an appointment.      SECONDARY DISCHARGE DIAGNOSES  Diagnosis: SOB (shortness of breath)  Assessment and Plan of Treatment: Shortness of breath now resolved. This is likely due to uncontrolled hypertension. Your chest xray is clear and your viral panel was negative.    Diagnosis: HTN (hypertension)  Assessment and Plan of Treatment: You came in with uncontrolled blood pressure. This is now resolved. You can continue your home valsartan-hydrochlorothiazide and nifedipine. You were started on metoprolol here in the hospital for better blood pressure control. Prescription was sent to your pharmacy at [facility]. Please follow up with Dr Perry in 1 week for further care, call to make an appointment.    Diagnosis: Vitamin D deficiency  Assessment and Plan of Treatment: You were found to have vitamin D deficiency. You were started on vitamin D supplementation once a week. Prescription was sent to [facility] pharmacy. Please follow up with your primary care physician for further monitoring of your vitamin D level, call to make an appointment.

## 2023-03-05 NOTE — H&P ADULT - PROBLEM SELECTOR PLAN 4
- potassium = 3.4  (magnesium w/in acceptable range at 2.10)  - supplemented w/ 40 meq, followed by 20 meq after 4 hours  - f/u for resolution

## 2023-03-05 NOTE — PROGRESS NOTE ADULT - PROBLEM SELECTOR PLAN 1
- sudden onset  & w/ shortness of breath, lightheadedness, w/ report of significantly elevated BP (BP = 200/117 in ED)  - history of smoking; quit prior to 2013.  FH of multiple members w/ HTN  - in the ED in 08/2022 for chest pain and negative findings for PE (CTA and US)  - likelihood that sleep deprivation, inadequate rest is impacting current condition   - troponin = < 6 --> < 6.  Pro-BNP = 74 (pro-BNP may be falsely low in the context of obesity)  - initial ECG not found in chart; will reorder (please f/u)  - CXR w/o any acute untoward findings  - s/p aspirin 162 mg and nifedipine XL 30 mg in the ED  - continuing w/ PTA nifedipine 30 mg, valsartan 160 mg.  Rescheduling HCTZ for tomorrow AM due to dehydration   - adding atorvastatin 20 mg, aspirin 81 mg, metoprolol 25 mg  - f/u TTE (ordered)  - f/u AM lab-work  - Cardiologist resuming care in the AM

## 2023-03-05 NOTE — PROGRESS NOTE ADULT - TIME BILLING
- Review of records, telemetry, vital signs and daily labs.   - General and cardiovascular physical examination.  - Generation of cardiovascular treatment plan.  - Coordination of care.      Patient was seen and examined by me on 3/5/23,interim events noted,labs and radiology studies reviewed.  Braxton Perry MD,FACC.  [address] [place], [place] [id number].  [phone] - Review of records, telemetry, vital signs and daily labs.   - General and cardiovascular physical examination.  - Generation of cardiovascular treatment plan.  - Coordination of care.  Attending supervision statement: I have personally seen and examined the patient.    I reviewed the overnight course of events on the unit, re-confirming the patient history.  I have reciewed interim Lab results,Radiology and Cardiac studies.   I discussed the care with the patient and availavle  family.   The plan of care was discussed with the ACP team and modifications were made to the notation where appropriate.   Differential diagnosis and plan of care discussed with patient after the evaluation.   Advanced care planning was discussed .    Advanced care planning forms were reviewed and discussed.    Risks, benefits and alternatives of scheduled procedures were discussed in detail and all questions were answered.   35 minutes spent on total encounter of which more than fifty percent of the encounter was spent counseling and/or coordinating care by the attending physician.  [facility] Code [id number]             Patient was seen and examined by me on 3/5/23,interim events noted,labs and radiology studies reviewed.  Braxton Perry MD,FACC.  [address] [facility].  [phone]

## 2023-03-05 NOTE — H&P ADULT - PROBLEM SELECTOR PLAN 5
- very tired, per patient, in the context of working 2 jobs (?full time)  - reports sleep deprivation due to same  - likely contributing to current medical condition  - f/u TSH level  - needs to modify schedule as much as possible to achieve adequate sleep/rest - very tired, per patient, in the context of working 2 jobs (full time)  - reports sleep deprivation due to same  - likely contributing to current medical condition  - f/u TSH level  - needs to modify schedule as much as possible to achieve adequate sleep/rest

## 2023-03-05 NOTE — PROGRESS NOTE ADULT - SUBJECTIVE AND OBJECTIVE BOX
PATIENT SEEN AND EXAMINED ON :-  DATE OF SERVICE: 3/5/23            Interim events note3/5/23 d,Labs ,Radiological studies and Cardiology tests reviewed .      MR#[id number]  PATIENT NAME:-MEAGAN [facility] COURSE: HPI:  57 year old female, with past history significant for Hypertension, and Smoking, presented to the ED secondary to chest pain and shortness of breath.  Seen and evaluated at bedside; lying right decubitus in bed in NAD.  Appears tired.  Patient endorses experiencing significant midsternal chest heaviness and shortness of breath (had to incorporate extra effort to breathe) at around 4:00 PM, prior to starting work.  Per documentation, patient went to the urgent care center and evaluation noted elevated blood pressure so patient was sent to the ED.  Patient complains of sf significant tiredness - related to working two (2) jobs.  No report of edema,  Has lightheadedness, headache (sometimes at the occiput and at other times frontal - involving the eyes).  Relates nausea, possibly related to having not eaten until ~ 2:00 PM on the day of coming to the ED.  Comments on inadequate sleep/rest due to working 2 full time jobs.    Vital signs upon ED presentation as follows: BP = 200/117, HR = 89, RR = 25, T = 36.7 C (98 F), O2 Sat = 98% on RA.  Diagnosed with Chest Pain, Shortness of breath, Headache and Elevated Blood Pressure Reading in the ED. (05 Mar 2023 00:45)      INTERIM EVENTS:Patient seen at bedside ,interim events noted.      [facility] -reviewed admission note, no change since admission  HEART FAILURE: Acute[ ]Chronic[ ] Systolic[ ] Diastolic[ ] Combined Systolic and Diastolic[ ]  CAD[ ] CABG[ ] PCI[ ]  DEVICES[ ] PPM[ ] ICD[ ] ILR[ ]  ATRIAL FIBRILLATION[ ] Paroxysmal[ ] Permanent[ ] CHADS2-[  ]  BRIANA[ ] CKD1[ ] CKD2[ ] CKD3[ ] CKD4[ ] ESRD[ ]  COPD[ ] HTN[ ]   DM[ ] Type1[ ] Type 2[ ]   CVA[ ] Paresis[ ]    AMBULATION: Assisted[ ] Cane/walker[ ] Independent[ ]    MEDICATIONS  (STANDING):  aspirin enteric coated 81 milliGRAM(s) Oral daily  atorvastatin 20 milliGRAM(s) Oral at bedtime  ergocalciferol 08279 Unit(s) Oral <User Schedule>  metoprolol tartrate 25 milliGRAM(s) Oral every 12 hours  NIFEdipine XL 30 milliGRAM(s) Oral daily  valsartan 160 milliGRAM(s) Oral daily    MEDICATIONS  (PRN):  acetaminophen     Tablet .. 650 milliGRAM(s) Oral every 6 hours PRN Mild Pain (1 - 3)  melatonin 3 milliGRAM(s) Oral at bedtime PRN Insomnia            REVIEW OF SYSTEMS:  Constitutional: [ ] fever, [ ]weight loss,  [ ]fatigue [ ]weight gain  Eyes: [ ] visual changes  Respiratory: [ ]shortness of breath;  [ ] cough, [ ]wheezing, [ ]chills, [ ]hemoptysis  Cardiovascular: [ ] chest pain, [ ]palpitations, [ ]dizziness,  [ ]leg swelling[ ]orthopnea[ ]PND  Gastrointestinal: [ ] abdominal pain, [ ]nausea, [ ]vomiting,  [ ]diarrhea [ ]Constipation [ ]Melena  Genitourinary: [ ] dysuria, [ ] hematuria [ ]Bui  Neurologic: [ ] headaches [ ] tremors[ ]weakness [ ]Paralysis Right[ ] Left[ ]  Skin: [ ] itching, [ ]burning, [ ] rashes  Endocrine: [ ] heat or cold intolerance  Musculoskeletal: [ ] joint pain or swelling; [ ] muscle, back, or extremity pain  Psychiatric: [ ] depression, [ ]anxiety, [ ]mood swings, or [ ]difficulty sleeping  Hematologic: [ ] easy bruising, [ ] bleeding gums    [ ] All remaining systems negative except as per above.   [ ]Unable to obtain.  [x] No change in ROS since admission      Vital Signs Last 24 Hrs  T(C): 36.9 (05 Mar 2023 13:13), Max: 36.9 (05 Mar 2023 13:13)  T(F): 98.4 (05 Mar 2023 13:13), Max: 98.4 (05 Mar 2023 13:13)  HR: 79 (05 Mar 2023 13:13) (64 - 89)  BP: 101/53 (05 Mar 2023 13:13) (101/53 - 200/117)  BP(mean): --  RR: 20 (05 Mar 2023 13:13) (15 - 25)  SpO2: 97% (05 Mar 2023 13:13) (97% - 100%)    Parameters below as of 05 Mar 2023 13:13  Patient On (Oxygen Delivery Method): room air      I&O's Summary      PHYSICAL EXAM:  General: No acute distress BMI-  HEENT: EOMI, PERRL  Neck: Supple, [ ] JVD  Lungs: Equal air entry bilaterally; [ ] rales [ ] wheezing [ ] rhonchi  Heart: Regular rate and rhythm; [x ] murmur   2/6 [ x] systolic [ ] diastolic [ ] radiation[ ] rubs [ ]  gallops  Abdomen: Nontender, bowel sounds present  Extremities: No clubbing, cyanosis, [ ] edema [ ]Pulses  equal and intact  Nervous system:  Alert & Oriented X3, no focal deficits  Psychiatric: Normal affect  Skin: No rashes or lesions    LABS:  03-05    138  |  100  |  14  ----------------------------<  96  3.1<L>   |  25  |  0.85    Ca    9.4      05 Mar 2023 05:13  Phos  4.1     03-05  Mg     2.20     03-05    TPro  8.2  /  Alb  4.1  /  TBili  <0.2  /  DBili  x   /  AST  15  /  ALT  18  /  AlkPhos  101  03-04    Creatinine Trend: 0.85<--, 0.94<--                        12.0   4.97  )-----------( 290      ( 05 Mar 2023 07:35 )             39.9         Serum Pro-Brain Natriuretic Peptide: 74 pg/mL (03-04-23 @ 22:00)         PATIENT SEEN AND EXAMINED ON :-  DATE OF SERVICE: 3/5/23            Interim events note3/5/23 d,Labs ,Radiological studies and Cardiology tests reviewed .      MR#[id number]  PATIENT NAME:-MEAGAN [facility] COURSE: HPI:  57 year old female, with past history significant for Hypertension, and Smoking, presented to the ED secondary to chest pain and shortness of breath.  Seen and evaluated at bedside; lying right decubitus in bed in NAD.  Appears tired.  Patient endorses experiencing significant midsternal chest heaviness and shortness of breath (had to incorporate extra effort to breathe) at around 4:00 PM, prior to starting work.  Per documentation, patient went to the urgent care center and evaluation noted elevated blood pressure so patient was sent to the ED.  Patient complains of sf significant tiredness - related to working two (2) jobs.  No report of edema,  Has lightheadedness, headache (sometimes at the occiput and at other times frontal - involving the eyes).  Relates nausea, possibly related to having not eaten until ~ 2:00 PM on the day of coming to the ED.  Comments on inadequate sleep/rest due to working 2 full time jobs.    Vital signs upon ED presentation as follows: BP = 200/117, HR = 89, RR = 25, T = 36.7 C (98 F), O2 Sat = 98% on RA.  Diagnosed with Chest Pain, Shortness of breath, Headache and Elevated Blood Pressure Reading in the ED. (05 Mar 2023 00:45)      INTERIM EVENTS:Patient seen at bedside ,interim events noted.Awake alert feels better still c/o chest discomfort      [facility] -reviewed admission note, no change since admission  HEART FAILURE: Acute[ ]Chronic[ ] Systolic[ ] Diastolic[ ] Combined Systolic and Diastolic[ ]  CAD[ ] CABG[ ] PCI[ ]  DEVICES[ ] PPM[ ] ICD[ ] ILR[ ]  ATRIAL FIBRILLATION[ ] Paroxysmal[ ] Permanent[ ] CHADS2-[  ]  BRIANA[ ] CKD1[ ] CKD2[ ] CKD3[ ] CKD4[ ] ESRD[ ]  COPD[ ] HTN[ ]   DM[ ] Type1[ ] Type 2[ ]   CVA[ ] Paresis[ ]    AMBULATION: Assisted[ ] Cane/walker[ ] Independent[ ]    MEDICATIONS  (STANDING):  aspirin enteric coated 81 milliGRAM(s) Oral daily  atorvastatin 20 milliGRAM(s) Oral at bedtime  ergocalciferol 01837 Unit(s) Oral <User Schedule>  metoprolol tartrate 25 milliGRAM(s) Oral every 12 hours  NIFEdipine XL 30 milliGRAM(s) Oral daily  valsartan 160 milliGRAM(s) Oral daily    MEDICATIONS  (PRN):  acetaminophen     Tablet .. 650 milliGRAM(s) Oral every 6 hours PRN Mild Pain (1 - 3)  melatonin 3 milliGRAM(s) Oral at bedtime PRN Insomnia            REVIEW OF SYSTEMS:  Constitutional: [ ] fever, [ ]weight loss,  [ ]fatigue [ ]weight gain  Eyes: [ ] visual changes  Respiratory: [ ]shortness of breath;  [ ] cough, [ ]wheezing, [ ]chills, [ ]hemoptysis  Cardiovascular: [ ] chest pain, [ ]palpitations, [ ]dizziness,  [ ]leg swelling[ ]orthopnea[ ]PND  Gastrointestinal: [ ] abdominal pain, [ ]nausea, [ ]vomiting,  [ ]diarrhea [ ]Constipation [ ]Melena  Genitourinary: [ ] dysuria, [ ] hematuria [ ]Bui  Neurologic: [ ] headaches [ ] tremors[ ]weakness [ ]Paralysis Right[ ] Left[ ]  Skin: [ ] itching, [ ]burning, [ ] rashes  Endocrine: [ ] heat or cold intolerance  Musculoskeletal: [ ] joint pain or swelling; [ ] muscle, back, or extremity pain  Psychiatric: [ ] depression, [ ]anxiety, [ ]mood swings, or [ ]difficulty sleeping  Hematologic: [ ] easy bruising, [ ] bleeding gums    [ ] All remaining systems negative except as per above.   [ ]Unable to obtain.  [x] No change in ROS since admission      Vital Signs Last 24 Hrs  T(C): 36.9 (05 Mar 2023 13:13), Max: 36.9 (05 Mar 2023 13:13)  T(F): 98.4 (05 Mar 2023 13:13), Max: 98.4 (05 Mar 2023 13:13)  HR: 79 (05 Mar 2023 13:13) (64 - 89)  BP: 101/53 (05 Mar 2023 13:13) (101/53 - 200/117)  BP(mean): --  RR: 20 (05 Mar 2023 13:13) (15 - 25)  SpO2: 97% (05 Mar 2023 13:13) (97% - 100%)    Parameters below as of 05 Mar 2023 13:13  Patient On (Oxygen Delivery Method): room air      I&O's Summary      PHYSICAL EXAM:  General: No acute distress BMI-35  HEENT: EOMI, PERRL  Neck: Supple, [ ] JVD  Lungs: Equal air entry bilaterally; [ ] rales [ ] wheezing [ ] rhonchi  Heart: Regular rate and rhythm; [x ] murmur   2/6 [ x] systolic [ ] diastolic [ ] radiation[ ] rubs [ ]  gallops  Abdomen: Nontender, bowel sounds present  Extremities: No clubbing, cyanosis, [ ] edema [ ]Pulses  equal and intact  Nervous system:  Alert & Oriented X3, no focal deficits  Psychiatric: Normal affect  Skin: No rashes or lesions    LABS:  03-05    138  |  100  |  14  ----------------------------<  96  3.1<L>   |  25  |  0.85    Ca    9.4      05 Mar 2023 05:13  Phos  4.1     03-05  Mg     2.20     03-05    TPro  8.2  /  Alb  4.1  /  TBili  <0.2  /  DBili  x   /  AST  15  /  ALT  18  /  AlkPhos  101  03-04    Creatinine Trend: 0.85<--, 0.94<--                        12.0   4.97  )-----------( 290      ( 05 Mar 2023 07:35 )             39.9         Serum Pro-Brain Natriuretic Peptide: 74 pg/mL (03-04-23 @ 22:00)

## 2023-03-05 NOTE — PROGRESS NOTE ADULT - PROBLEM SELECTOR PLAN 5
- very tired, per patient, in the context of working 2 jobs (full time)  - reports sleep deprivation due to same  - likely contributing to current medical condition  - f/u TSH level  - needs to modify schedule as much as possible to achieve adequate sleep/rest
- very tired, per patient, in the context of working 2 jobs (full time)  - reports sleep deprivation due to same  - likely contributing to current medical condition  - f/u TSH level  - needs to modify schedule as much as possible to achieve adequate sleep/rest

## 2023-03-05 NOTE — DISCHARGE NOTE PROVIDER - HOSPITAL COURSE
57 year old female, with past history significant for Hypertension, and Smoking, presented to the ED secondary to chest pain and shortness of breath.  Diagnosed with Chest Pain, Shortness of breath, Headache and Elevated Blood Pressure Reading in the ED.    chest tightness  - sudden onset  & w/SOB, lightheadedness, w/ report of significantly elevated BP   - history of smoking; quit prior to 2013.  FH of multiple members w/ HTN  - in the ED in 08/2022 for chest pain and negative findings for PE (CTA and US)  - likelihood that sleep deprivation, inadequate rest is impacting current condition   - troponin <6 x3  - Pro-BNP = 74 (pro-BNP may be falsely low in the context of obesity)  - CXR clear   - s/p aspirin 162 mg and nifedipine XL 30 mg in the ED  - nifedipine, valsartan, HCTZ   - atorvastatin, ASA, metoprolol   - TTE EF 60%  - TSH WNL   - a1c 5.9   - stress test normal     hypertensive urgency - resolved   - troponin <6 x2  - Pro-BNP = 74  - telemetry     dehydration   - dry oral mucosa  - although pro-BNP = 74, in the context of obesity, unlikely falsely low  - IVF     tiredness  - very tired  - per patient, in the context of working 2 jobs (full time)  - reports sleep deprivation due to same  - likely contributing to current medical condition  - TSH WNL     This case was reviewed with Dr. Perry. The patient is medically stable and optimized for discharge. All medications were reviewed and prescriptions were sent to [facility].  No need for ASA and lipitor per Dr Perry.

## 2023-03-05 NOTE — PROGRESS NOTE ADULT - PROBLEM SELECTOR PLAN 4
- potassium = 3.4  (magnesium w/in acceptable range at 2.10)  - supplemented w/ 40 meq, followed by 20 meq after 4 hours  - f/u for resolution
- potassium = 3.4  (magnesium w/in acceptable range at 2.10)  - supplemented w/ 40 meq, followed by 20 meq after 4 hours  - f/u for resolution

## 2023-03-05 NOTE — DISCHARGE NOTE NURSING/CASE MANAGEMENT/SOCIAL WORK - NSDCPEFALRISK_GEN_ALL_CORE
For information on Fall & Injury Prevention, visit: [email or web address] OR  [email or web address] OR  https://www.cdc.gov/steadi/patient.html

## 2023-03-05 NOTE — ED ADULT NURSE REASSESSMENT NOTE - NS ED NURSE REASSESS COMMENT FT1
Received report from JYOTI Christina @[id number]. Pt is AAOx4 resting in bed in non acute distress. Respirations even and unlabored. NSR on cardiac monitor. VS as noted. Pt offers no complaints. Denies pain/discomfort. Pt updated on plan of care. Tele admit awaiting bed assignment Received report from JYOTI Conte @[id number]. Pt is AAOx4 resting in bed in non acute distress. Respirations even and unlabored. NSR on cardiac monitor. VS as noted. Pt offers no complaints. Denies pain/discomfort. Pt updated on plan of care. Tele admit awaiting bed assignment

## 2023-03-05 NOTE — H&P ADULT - NSHPLABSRESULTS_GEN_ALL_CORE
LAB-WORK/STUDIES:                          12.4   5.53  )-----------( 328      ( 04 Mar 2023 22:00 )             40.6     04 Mar 2023 22:00    140    |  102    |  18     ----------------------------<  98     3.4     |  26     |  0.94     Ca    9.5        04 Mar 2023 22:00  Mg     2.10      04 Mar 2023 22:00    TPro  8.2    /  Alb  4.1    /  TBili  <0.2   /  DBili  x      /  AST  15     /  ALT  18     /  AlkPhos  101    04 Mar 2023 22:00    LIVER FUNCTIONS - ( 04 Mar 2023 22:00 )  Alb: 4.1 g/dL / Pro: 8.2 g/dL / ALK PHOS: 101 U/L / ALT: 18 U/L / AST: 15 U/L / GGT: x             CAPILLARY BLOOD GLUCOSE    =======================================================================        =======================================================================

## 2023-03-05 NOTE — DISCHARGE NOTE PROVIDER - NSDCCPTREATMENT_GEN_ALL_CORE_FT
PRINCIPAL PROCEDURE  Procedure: 2D echocardiography  Findings and Treatment: PROCEDURE: Transthoracic echocardiogram with 2-D, M-Mode  and complete spectral and color flow Doppler.  INDICATION: Chest pain, unspecified (R07.9)  ------------------------------------------------------------------------  DIMENSIONS:  Dimensions:     Normal Values:  LA:     4.0 cm    2.0 - 4.0 cm  Ao:     2.6 cm    2.0 - 3.8 cm  SEPTUM: 1.6 cm    0.6 - 1.2 cm  PWT:    1.4 cm    0.6 - 1.1 cm  LVIDd:  4.4 cm    3.0 - 5.6 cm  LVIDs:  3.0 cm    1.8 - 4.0 cm  Derived Variables:  LVMI: 125 g/m2  RWT: 0.63  Fractional short: 32 %  Ejection Fraction (Modified Baker Rule): 60 %  ------------------------------------------------------------------------  OBSERVATIONS:  Mitral Valve: Normal mitral valve. Minimal mitral  regurgitation.  Aortic Root: Normal aortic root.  Aortic Valve: Normal trileaflet aortic valve.  Left Atrium: Normal left atrium.  LA volume index = 23  cc/m2.  Left Ventricle: Normal left ventricular systolic function.  No segmental wall motion abnormalities. Mild concentric  left ventricular hypertrophy. Normal left ventricular  diastolic function.  Right Heart: Normal right atrium. Normal right ventricular  size and function. Normal tricuspid valve.  Mild tricuspid  regurgitation. Normal pulmonic valve.  Pericardium/PleuraNormal pericardium with no pericardial  effusion.  Hemodynamic: Estimated right ventricular systolic pressure  equals 39 mm Hg, assuming right atrial pressure equals 10  mm Hg, consistent with borderline pulmonary hypertension.  ------------------------------------------------------------------------  CONCLUSIONS:  1. Mild concentric left ventricular hypertrophy.  2. Normal left ventricular systolic function. No segmental  wall motion abnormalities.  3. Normal left ventricular diastolic function.  4. Normal right ventricular size and function.  5. Normal tricuspid valve.  Mild tricuspid regurgitation.      SECONDARY PROCEDURE  Procedure: Pharmacologic cardiovascular stress test  Findings and Treatment: NUCLEAR FINDINGS:  The left ventricle was normal in size. Normal myocardial  perfusion scan,with no evidence of infarction or inducible  ischemia.  ------------------------------------------------------------------------  GATED ANALYSIS:  Post-stress gated wall motion analysis was performed (LVEF  = 62 %;LVEDV = 94 ml.), revealing normal LV function.  ------------------------------------------------------------------------  IMPRESSIONS:Normal Study  * The left ventricle was normal in size.  * Tracer uptake was homogeneous throughout the left  ventricle.  * Normal study; no evidence for myocardial infarction or  ischemia.  * Gated wall motion analysis was performed, and shows  normal wall motion.

## 2023-03-05 NOTE — DISCHARGE NOTE PROVIDER - CARE PROVIDER_API CALL
Braxton Perry)  Cardiology  [address] [place] [address]  Phone: [phone]  Fax: [phone]  Follow Up Time:

## 2023-03-05 NOTE — PROGRESS NOTE ADULT - ASSESSMENT
[ x ]  Lab studies personally reviewed  [ x ]  Radiology personally reviewed  [ x ]  Old records personally reviewed    57 year old female, with past history significant for Hypertension, and Smoking, presented to the ED secondary to chest pain and shortness of breath.  Diagnosed with Chest Pain, Shortness of breath, Headache and Elevated Blood Pressure Reading in the ED.
[ x ]  Lab studies personally reviewed  [ x ]  Radiology personally reviewed  [ x ]  Old records personally reviewed    57 year old female, with past history significant for Hypertension, and Smoking, presented to the ED secondary to chest pain and shortness of breath.  Diagnosed with Chest Pain, Shortness of breath, Headache and Elevated Blood Pressure Reading in the ED.

## 2023-03-05 NOTE — DISCHARGE NOTE PROVIDER - NSDCMRMEDTOKEN_GEN_ALL_CORE_FT
ergocalciferol 1.25 mg (50,000 intl units) oral capsule: 1 cap(s) orally once a week, every Sunday for 7 weeks.   metoprolol tartrate 25 mg oral tablet: 1 tab(s) orally every 12 hours  Nifedical XL 30 mg oral tablet, extended release: 1 tab(s) orally once a day  valsartan-hydrochlorothiazide 160 mg-25 mg oral tablet: 1 tab(s) orally once a day

## 2023-03-05 NOTE — PROGRESS NOTE ADULT - PROBLEM SELECTOR PLAN 6
- ambulatory  - on aspirin  - if becomes recumbent, would start pharmacological DVT prophylaxis
- ambulatory  - on aspirin  - if becomes recumbent, would start pharmacological DVT prophylaxis

## 2023-03-05 NOTE — PROGRESS NOTE ADULT - PROBLEM SELECTOR PLAN 1
- sudden onset  & w/ shortness of breath, lightheadedness, w/ report of significantly elevated BP (BP = 200/117 in ED)  - history of smoking; quit prior to 2013.  FH of multiple members w/ HTN  - in the ED in 08/2022 for chest pain and negative findings for PE (CTA and US)  - likelihood that sleep deprivation, inadequate rest is impacting current condition   - troponin = < 6 --> < 6.  Pro-BNP = 74 (pro-BNP may be falsely low in the context of obesity)  - initial ECG not found in chart; will reorder (please f/u)  - CXR w/o any acute untoward findings  - s/p aspirin 162 mg and nifedipine XL 30 mg in the ED  - continuing w/ PTA nifedipine 30 mg, valsartan 160 mg.  Rescheduling HCTZ for tomorrow AM due to dehydration   - adding atorvastatin 20 mg, aspirin 81 mg, metoprolol 25 mg  - f/u TTE (ordered)  - f/u AM lab-work  - Cardiologist resuming care in the AM - sudden onset  & w/ shortness of breath, lightheadedness, w/ report of significantly elevated BP (BP = 200/117 in ED)  - history of smoking; quit prior to 2013.  FH of multiple members w/ HTN  - in the ED in 08/2022 for chest pain and negative findings for PE (CTA and US)  - likelihood that sleep deprivation, inadequate rest is impacting current condition   - troponin = < 6 --> < 6.  Pro-BNP = 74 (pro-BNP may be falsely low in the context of obesity)  - initial ECG not found in chart; will reorder (please f/u)  - CXR w/o any acute untoward findings  - s/p aspirin 162 mg and nifedipine XL 30 mg in the ED  - continuing w/ PTA nifedipine 30 mg, valsartan 160 mg.  Rescheduling HCTZ for tomorrow AM due to dehydration   - adding atorvastatin 20 mg, aspirin 81 mg, metoprolol 25 mg  -TTE Normal LV Systolic function  -NST-no evidence for myocardial infarction or ischemia.    BP is better stable to discharge and follow up as outpatient

## 2023-03-05 NOTE — PROGRESS NOTE ADULT - PROBLEM SELECTOR PLAN 2
- BP initially = 200/117  - Troponin = <6 --> < 6.  Pro-BNP = 74.  No signs of renal compromise.  No mental status changes  - medications as above  - modify therapy, as needed  - continues on Telemetry - BP initially = 200/117  - Troponin = <6 --> < 6.  Pro-BNP = 74.  No signs of renal compromise.  No mental status changes  - medications as above  - modify therapy, as needed  - BP stable

## 2023-03-05 NOTE — DISCHARGE NOTE NURSING/CASE MANAGEMENT/SOCIAL WORK - PATIENT PORTAL LINK FT
You can access the FollowMyHealth Patient Portal offered by [facility] by registering at the following website: [email or web address]. By joining [facility]’s FollowMyHealth portal, you will also be able to view your health information using other applications (apps) compatible with our system.

## 2023-03-05 NOTE — PATIENT PROFILE ADULT - FALL HARM RISK - UNIVERSAL INTERVENTIONS
Bed in lowest position, wheels locked, appropriate side rails in place/Call bell, personal items and telephone in reach/Instruct patient to call for assistance before getting out of bed or chair/Non-slip footwear when patient is out of bed/[place] to call system/Physically safe environment - no spills, clutter or unnecessary equipment/Purposeful Proactive Rounding/Room/bathroom lighting operational, light cord in reach

## 2023-03-05 NOTE — H&P ADULT - NSHPREVIEWOFSYSTEMS_GEN_ALL_CORE
REVIEW OF SYSTEMS:    CONSTITUTIONAL: Significant tiredness.  (+) headache (occipital and frontal at times).  No weakness, fever, chills or sweating  EYES/ENT: No visual changes.  No dysphagia  NECK: No pain or stiffness  RESPIRATORY: No cough or hemoptysis.  (+) shortness of breath  CARDIOVASCULAR: Midsternal chest tightness.   No palpitations.  No lower extremity edema  GASTROINTESTINAL: No epigastric or abdominal pain. No nausea, vomiting or hematemesis.  No diarrhea or constipation. No melena or hematochezia.  GENITOURINARY: No dysuria, frequency or hematuria  MUSCULOSKELETAL: No joint pain, swelling, decreased ROM, erythema, warmth  NEUROLOGICAL: No numbness or weakness  PSYCHIATRY: Sleep deprivation.  No anxiety, or depression.  SKIN: No itching, burning, rashes, or lesions   All other review of systems is negative unless indicated above.

## 2023-03-05 NOTE — PROGRESS NOTE ADULT - SUBJECTIVE AND OBJECTIVE BOX
SUBJECTIVE / OVERNIGHT EVENTS:pt seen and examined  03-05-23     MEDICATIONS  (STANDING):  aspirin enteric coated 81 milliGRAM(s) Oral daily  atorvastatin 20 milliGRAM(s) Oral at bedtime  ergocalciferol 96704 Unit(s) Oral <User Schedule>  metoprolol tartrate 25 milliGRAM(s) Oral every 12 hours  NIFEdipine XL 30 milliGRAM(s) Oral daily  valsartan 160 milliGRAM(s) Oral daily    MEDICATIONS  (PRN):  acetaminophen     Tablet .. 650 milliGRAM(s) Oral every 6 hours PRN Mild Pain (1 - 3)  melatonin 3 milliGRAM(s) Oral at bedtime PRN Insomnia    T(C): 36.9 (03-05-23 @ 13:13), Max: 36.9 (03-05-23 @ 13:13)  HR: 79 (03-05-23 @ 13:13) (64 - 79)  BP: 101/53 (03-05-23 @ 13:13) (101/53 - 149/88)  RR: 20 (03-05-23 @ 13:13) (15 - 20)  SpO2: 97% (03-05-23 @ 13:13) (97% - 100%)    CAPILLARY BLOOD GLUCOSE        I&O's Summary      Constitutional: No fever, fatigue  Skin: No rash.  Eyes: No recent vision problems or eye pain.  ENT: No congestion, ear pain, or sore throat.  Cardiovascular: No chest pain or palpation.  Respiratory: No cough, shortness of breath, congestion, or wheezing.  Gastrointestinal: No abdominal pain, nausea, vomiting, or diarrhea.  Genitourinary: No dysuria.  Musculoskeletal: No joint swelling.  Neurologic: No headache.    PHYSICAL EXAM:  GENERAL: NAD  EYES: EOMI, PERRLA  NECK: Supple, No JVD  CHEST/LUNG: cta astrid  HEART:  S1 , S2 +  ABDOMEN: soft , bs+  EXTREMITIES:  no edema  NEUROLOGY:alert awake oriented       LABS:                        12.0   4.97  )-----------( 290      ( 05 Mar 2023 07:35 )             39.9     03-05    138  |  100  |  14  ----------------------------<  96  3.1<L>   |  25  |  0.85    Ca    9.4      05 Mar 2023 05:13  Phos  4.1     03-05  Mg     2.20     03-05    TPro  8.2  /  Alb  4.1  /  TBili  <0.2  /  DBili  x   /  AST  15  /  ALT  18  /  AlkPhos  101  03-04              RADIOLOGY & ADDITIONAL TESTS:    Imaging Personally Reviewed:    Consultant(s) Notes Reviewed:      Care Discussed with Consultants/Other Providers:

## 2023-03-05 NOTE — H&P ADULT - HISTORY OF PRESENT ILLNESS
57 year old female, with past history significant for Hypertension, presented to the ED secondary to chest pain and shortness of breath.  Seen and evaluated at bedside;    Vital signs upon ED presentation as follows: BP = 200/117, HR = 89, RR = 25, T = 36.7 C (98 F), O2 Sat = 98% on RA.  Diagnosed with Chest Pain, Shortness of breath, Headache and Elevated Blood Pressure Reading in the ED. 57 year old female, with past history significant for Hypertension, and Smoking, presented to the ED secondary to chest pain and shortness of breath.  Seen and evaluated at bedside; lying right decubitus in bed in NAD.  Appears tired.  Patient endorses experiencing significant midsternal chest heaviness and shortness of breath (had to incorporate extra effort to breathe) at around 4:00 PM, prior to starting work.  Per documentation, patient went to the urgent care center and evaluation noted elevated blood pressure so patient was sent to the ED.  Patient complains of sf significant tiredness - related to working two (2) jobs.  No report of edema,  Has lightheadedness, headache (sometimes at the occiput and at other times frontal - involving the eyes).  Relates nausea, possibly related to having not eaten until ~ 2:00 PM on the day of coming to the ED.    Vital signs upon ED presentation as follows: BP = 200/117, HR = 89, RR = 25, T = 36.7 C (98 F), O2 Sat = 98% on RA.  Diagnosed with Chest Pain, Shortness of breath, Headache and Elevated Blood Pressure Reading in the ED. 57 year old female, with past history significant for Hypertension, and Smoking, presented to the ED secondary to chest pain and shortness of breath.  Seen and evaluated at bedside; lying right decubitus in bed in NAD.  Appears tired.  Patient endorses experiencing significant midsternal chest heaviness and shortness of breath (had to incorporate extra effort to breathe) at around 4:00 PM, prior to starting work.  Per documentation, patient went to the urgent care center and evaluation noted elevated blood pressure so patient was sent to the ED.  Patient complains of sf significant tiredness - related to working two (2) jobs.  No report of edema,  Has lightheadedness, headache (sometimes at the occiput and at other times frontal - involving the eyes).  Relates nausea, possibly related to having not eaten until ~ 2:00 PM on the day of coming to the ED.  Comments on inadequate sleep/rest due to working 2 full time jobs.    Vital signs upon ED presentation as follows: BP = 200/117, HR = 89, RR = 25, T = 36.7 C (98 F), O2 Sat = 98% on RA.  Diagnosed with Chest Pain, Shortness of breath, Headache and Elevated Blood Pressure Reading in the ED.

## 2023-03-05 NOTE — H&P ADULT - NSICDXFAMILYHX_GEN_ALL_CORE_FT
FAMILY HISTORY:  Mother  Still living? Unknown  Family history of diabetes mellitus (DM), Age at diagnosis: Age Unknown  Family history of hypertension, Age at diagnosis: Age Unknown    Sibling  Still living? Unknown  Family history of hypertension, Age at diagnosis: Age Unknown

## 2023-03-05 NOTE — H&P ADULT - NSHPSOCIALHISTORY_GEN_ALL_CORE
SOCIAL HISTORY:    Marital Status:  (  )    (  ) Single        (  )        (  )        (  )   Lives with:          (  ) Alone      (  ) Spouse     (  ) Children         (  ) Parents             (  ) Other    No history of smoking  No history of alcohol abuse  No history of illegal drug use    Occupation: SOCIAL HISTORY:    Marital Status:  (  )    ( x ) Single        (  )        (  )        (  )   Lives with:          (  ) Alone      (  ) Spouse     ( x ) Children/Daughter         (  ) Parents             (  ) Other    History of smoking; stopped prior to 2013 after smoking ~ 1ppd x ~ 20 years  No history of alcohol abuse  No history of illegal drug use    Occupation:

## 2023-03-05 NOTE — DISCHARGE NOTE NURSING/CASE MANAGEMENT/SOCIAL WORK - FLU SEASON?
TANNER for patient letting her know that 1 medrol tablet was ordered to her local pharmacy. Instructed to call with any questions.    Yes...

## 2023-03-05 NOTE — PROGRESS NOTE ADULT - PROBLEM SELECTOR PLAN 3
- dry oral mucosa  - although pro-BNP = 74, in the context of obesity, unlikely falsely low  - prescribed LR one liter over 2 hours  - f/u electrolytes  - evaluate for improvement  - oral hydration, as tolerated
- dry oral mucosa  - although pro-BNP = 74, in the context of obesity, unlikely falsely low  - prescribed LR one liter over 2 hours  - f/u electrolytes  - evaluate for improvement  - oral hydration, as tolerated

## 2023-03-05 NOTE — H&P ADULT - PROBLEM SELECTOR PLAN 1
- sudden onset  & w/ shortness of breath, lightheadedness, w/ report of significantly elevated BP (BP = 200/117 in ED)  - history of smoking; quit prior to 2013.  FH of multiple members w/ HTN  - in the ED in 08/2022 for chest pain and negative findings for PE (CTA and US)  - troponin = < 6 --> < 6.  Pro-BNP = 74 (pro-BNP may be falsely low in the context of obesity)  - initial ECG not found in chart; will reorder (please f/u)  - CXR w/o any acute untoward findings  - s/p aspirin 162 mg and nifedipine XL 30 mg in the ED  - continuing w/ PTA nifedipine 30 mg, valsartan 160 mg.  Rescheduling HCTZ for tomorrow AM due to dehydration   - adding atorvastatin 20 mg, aspirin 81 mg, metoprolol 25 mg  - f/u TTE (ordered)  - f/u AM lab-work  - Cardiologist resuming care in the AM - sudden onset  & w/ shortness of breath, lightheadedness, w/ report of significantly elevated BP (BP = 200/117 in ED)  - history of smoking; quit prior to 2013.  FH of multiple members w/ HTN  - in the ED in 08/2022 for chest pain and negative findings for PE (CTA and US)  - likelihood that sleep deprivation, inadequate rest is impacting current condition   - troponin = < 6 --> < 6.  Pro-BNP = 74 (pro-BNP may be falsely low in the context of obesity)  - initial ECG not found in chart; will reorder (please f/u)  - CXR w/o any acute untoward findings  - s/p aspirin 162 mg and nifedipine XL 30 mg in the ED  - continuing w/ PTA nifedipine 30 mg, valsartan 160 mg.  Rescheduling HCTZ for tomorrow AM due to dehydration   - adding atorvastatin 20 mg, aspirin 81 mg, metoprolol 25 mg  - f/u TTE (ordered)  - f/u AM lab-work  - Cardiologist resuming care in the AM

## 2023-03-05 NOTE — H&P ADULT - PROBLEM SELECTOR PLAN 2
- BP initially = 200/117  - Troponin = <6 --> < 6.  Pro-BNP = 74.  No signs of renal compromise.  No mental status changes  - medications as above  - modify therapy, as needed  - continues on Telemetry

## 2023-03-05 NOTE — H&P ADULT - NSICDXPASTMEDICALHX_GEN_ALL_CORE_FT
PAST MEDICAL HISTORY:  HTN (hypertension)      PAST MEDICAL HISTORY:  History of smoking     HTN (hypertension)

## 2023-09-01 NOTE — DISCHARGE NOTE PROVIDER - CARE PROVIDERS DIRECT ADDRESSES
TRANSFER - IN REPORT:    Verbal report received from Hannah Winter  being received from ED for ordered procedure      Report consisted of patient's Situation, Background, Assessment and   Recommendations(SBAR). Information from the following report(s) Nurse Handoff Report, ED Encounter Summary, Intake/Output, and MAR was reviewed with the receiving nurse. Opportunity for questions and clarification was provided. Assessment completed upon patient's arrival to unit and care assumed.
TRANSFER - OUT REPORT:    Verbal report given to Rhonda erich Pearl  being transferred to [facility] for routine post-op       Report consisted of patient's Situation, Background, Assessment and   Recommendations(SBAR). Information from the following report(s) Nurse Handoff Report, Surgery Report, and MAR was reviewed with the receiving nurse. Lines:   Peripheral IV 08/31/23 Right Antecubital (Active)   Site Assessment Clean, dry & intact 09/01/23 0205   Line Status Infusing 09/01/23 0205   Line Care Connections checked and tightened 09/01/23 0205   Phlebitis Assessment No symptoms 09/01/23 0205   Infiltration Assessment 0 09/01/23 0205   Alcohol Cap Used Yes 09/01/23 0205   Dressing Status Clean, dry & intact 09/01/23 0205   Dressing Type Transparent 09/01/23 0205        Opportunity for questions and clarification was provided.       Patient transported with:  MiName
,DirectAddress_Unknown

## 2023-09-26 NOTE — ED ADULT NURSE NOTE - NSFALLRSKPASTHIST_ED_ALL_ED
Considered augmenting, patient would like to continue current dose of lamotrigine at this time.  Discussed potentially using the duloxetine         [facility] Psychiat[facility] options:    [facility]    [email or web address]    [facility]  [email or web address]    [facility]  [email or web address]    [facility]  [email or web address]    [facility]  [email or web address]      no

## 2023-12-09 NOTE — ED ADULT NURSE NOTE - CAS ELECT INFOMATION PROVIDED
Subjective:   Mile Birmingham is a 40 y.o. female who presents for Cough, Shortness of Breath, and Gastrophageal Reflux      HPI: This is a 40-year-old female who presents today for cough, sore throat, and shortness of breath.  This is a new problem.  Patient reports symptoms developed yesterday.  She has not take anything for her symptoms.  She reports multiple sick contacts.  She denies fevers, chills, body aches.  No underlying history of lung disease.      Review of Systems   Constitutional:  Negative for chills, diaphoresis, fever, malaise/fatigue and weight loss.   HENT:  Positive for sore throat.    Respiratory:  Positive for cough and shortness of breath. Negative for hemoptysis, sputum production and wheezing.        Medications:    Current Outpatient Medications on File Prior to Visit   Medication Sig Dispense Refill    naproxen (NAPROSYN) 500 MG Tab Take 1 Tablet by mouth 2 times a day with meals for 14 days. 28 Tablet 0    Blood Pressure Monitoring (BLOOD PRESSURE DIGITAL SOLN) Kit       Naproxen (NAPROSYN PO) naproxen      methylPREDNISolone (MEDROL DOSEPAK) 4 MG Tablet Therapy Pack Use as directed on package. May take all of Day 1 as a single dose (24 mg) when starting. 1 Each 0    benzonatate (TESSALON) 100 MG Cap Take 1 Capsule by mouth 3 times a day as needed for Cough. 30 Capsule 0    albuterol 108 (90 Base) MCG/ACT Aero Soln inhalation aerosol Inhale 2 Puffs every 6 hours as needed for Shortness of Breath. 8.5 g 0    Cranberry 400 MG Cap cranberry 400 mg capsule   Take by oral route. (Patient not taking: Reported on 12/8/2023)       No current facility-administered medications on file prior to visit.        Allergies:   Bee venom    Problem List:   There is no problem list on file for this patient.       Surgical History:  No past surgical history on file.    Past Social Hx:   Social History     Tobacco Use    Smoking status: Never    Smokeless tobacco: Never          Problem list,  "medications, and allergies reviewed by myself today in Epic.     Objective:     /78 (BP Location: Left arm, Patient Position: Sitting, BP Cuff Size: Adult)   Pulse 86   Temp 36.3 °C (97.3 °F) (Temporal)   Resp 14   Ht 1.6 m (5' 3\")   Wt 81.6 kg (180 lb)   SpO2 99%   BMI 31.89 kg/m²     Physical Exam  Vitals and nursing note reviewed.   Constitutional:       General: She is not in acute distress.     Appearance: Normal appearance. She is normal weight. She is not ill-appearing, toxic-appearing or diaphoretic.   HENT:      Head: Normocephalic and atraumatic.      Right Ear: Tympanic membrane, ear canal and external ear normal. There is no impacted cerumen.      Left Ear: Tympanic membrane, ear canal and external ear normal. There is no impacted cerumen.      Nose: Nose normal. No congestion or rhinorrhea.      Mouth/Throat:      Mouth: Mucous membranes are moist.      Pharynx: Oropharynx is clear. No oropharyngeal exudate or posterior oropharyngeal erythema.   Cardiovascular:      Rate and Rhythm: Normal rate and regular rhythm.      Pulses: Normal pulses.      Heart sounds: Normal heart sounds. No murmur heard.     No friction rub. No gallop.   Pulmonary:      Effort: Pulmonary effort is normal. No respiratory distress.      Breath sounds: Normal breath sounds. No stridor. No wheezing, rhonchi or rales.   Chest:      Chest wall: No tenderness.   Musculoskeletal:      Cervical back: Neck supple. No tenderness.   Lymphadenopathy:      Cervical: No cervical adenopathy.   Skin:     General: Skin is warm and dry.      Capillary Refill: Capillary refill takes less than 2 seconds.   Neurological:      General: No focal deficit present.      Mental Status: She is alert and oriented to person, place, and time. Mental status is at baseline.      Cranial Nerves: No cranial nerve deficit.      Motor: No weakness.      Gait: Gait normal.   Psychiatric:         Mood and Affect: Mood normal.         Behavior: Behavior " normal.         Thought Content: Thought content normal.         Judgment: Judgment normal.         Assessment/Plan:     Diagnosis and associated orders:   1. COVID-19  POCT CoV-2, Flu A/B, RSV by PCR         Results for orders placed or performed in visit on 12/08/23   POCT CoV-2, Flu A/B, RSV by PCR   Result Value Ref Range    SARS-CoV-2 by PCR Positive (A) Negative, Invalid    Influenza virus A RNA Negative Negative, Invalid    Influenza virus B, PCR Negative Negative, Invalid    RSV, PCR Negative Negative, Invalid          Comments/MDM:   Pt is clinically stable at today's acute urgent care visit.  No acute distress noted. Appropriate for outpatient management at this time.     Acute problem.  COVID-19 positive.  Results called to patient.I have recommended supportive care to include; Increased fluids, rest, over-the-counter cold and flu medications, alternating Tylenol and/or ibuprofen per manufactures instructions for symptomatic relief and fevers, and the use of a humidifier. Patient is to return to  or go to ER for any new or worsening s/s, and follow up with PCP for recheck. Patient is agreeable with plan of care and verbalized good understanding.            Discussed DDx, management options (risks,benefits, and alternatives to planned treatment), natural progression and supportive care.  Expressed understanding and the treatment plan was agreed upon. Questions were encouraged and answered   Return to urgent care prn if new or worsening sx or if there is no improvement in condition prn.    Educated in Red flags and indications to immediately call 911 or present to the Emergency Department.   Advised the patient to follow-up with the primary care physician for recheck, reevaluation, and consideration of further management.    I personally reviewed prior external notes and test results pertinent to today's visit.  I have independently reviewed and interpreted all diagnostics ordered during this urgent care  acute visit.       Please note that this dictation was created using voice recognition software. I have made a reasonable attempt to correct obvious errors, but I expect that there are errors of grammar and possibly content that I did not discover before finalizing the note.    This note was electronically signed by PARMJIT Maxwell     DC instructions

## 2024-03-20 NOTE — PROGRESS NOTE ADULT - PROBLEM SELECTOR PROBLEM 5
Spoke with pt's mother.   Appt time switched from 8:15 to 9:00am per request.     
Tiredness
Tiredness

## 2024-08-19 ENCOUNTER — EMERGENCY (EMERGENCY)
Facility: HOSPITAL | Age: 59
LOS: 1 days | Discharge: HOME | End: 2024-08-19
Attending: EMERGENCY MEDICINE
Payer: COMMERCIAL

## 2024-08-19 VITALS
RESPIRATION RATE: 17 BRPM | OXYGEN SATURATION: 96 % | DIASTOLIC BLOOD PRESSURE: 88 MMHG | HEIGHT: 65 IN | TEMPERATURE: 98 F | SYSTOLIC BLOOD PRESSURE: 149 MMHG | WEIGHT: 255.52 LBS | HEART RATE: 86 BPM

## 2024-08-19 LAB
ALBUMIN SERPL ELPH-MCNC: 3.4 G/DL — LOW (ref 3.5–5)
ALP SERPL-CCNC: 115 U/L — SIGNIFICANT CHANGE UP (ref 40–120)
ALT FLD-CCNC: 29 U/L DA — SIGNIFICANT CHANGE UP (ref 10–60)
ANION GAP SERPL CALC-SCNC: 6 MMOL/L — SIGNIFICANT CHANGE UP (ref 5–17)
AST SERPL-CCNC: 17 U/L — SIGNIFICANT CHANGE UP (ref 10–40)
BASOPHILS # BLD AUTO: 0.03 K/UL — SIGNIFICANT CHANGE UP (ref 0–0.2)
BASOPHILS NFR BLD AUTO: 0.5 % — SIGNIFICANT CHANGE UP (ref 0–2)
BILIRUB SERPL-MCNC: 0.2 MG/DL — SIGNIFICANT CHANGE UP (ref 0.2–1.2)
BUN SERPL-MCNC: 17 MG/DL — SIGNIFICANT CHANGE UP (ref 7–18)
CALCIUM SERPL-MCNC: 9.3 MG/DL — SIGNIFICANT CHANGE UP (ref 8.4–10.5)
CHLORIDE SERPL-SCNC: 107 MMOL/L — SIGNIFICANT CHANGE UP (ref 96–108)
CK SERPL-CCNC: 112 U/L — SIGNIFICANT CHANGE UP (ref 21–215)
CO2 SERPL-SCNC: 28 MMOL/L — SIGNIFICANT CHANGE UP (ref 22–31)
CREAT SERPL-MCNC: 1.16 MG/DL — SIGNIFICANT CHANGE UP (ref 0.5–1.3)
EGFR: 55 ML/MIN/1.73M2 — LOW
EOSINOPHIL # BLD AUTO: 0.17 K/UL — SIGNIFICANT CHANGE UP (ref 0–0.5)
EOSINOPHIL NFR BLD AUTO: 2.7 % — SIGNIFICANT CHANGE UP (ref 0–6)
GLUCOSE SERPL-MCNC: 117 MG/DL — HIGH (ref 70–99)
HCT VFR BLD CALC: 41 % — SIGNIFICANT CHANGE UP (ref 34.5–45)
HGB BLD-MCNC: 12.8 G/DL — SIGNIFICANT CHANGE UP (ref 11.5–15.5)
IMM GRANULOCYTES NFR BLD AUTO: 0.2 % — SIGNIFICANT CHANGE UP (ref 0–0.9)
LYMPHOCYTES # BLD AUTO: 2.26 K/UL — SIGNIFICANT CHANGE UP (ref 1–3.3)
LYMPHOCYTES # BLD AUTO: 36.3 % — SIGNIFICANT CHANGE UP (ref 13–44)
MCHC RBC-ENTMCNC: 28.5 PG — SIGNIFICANT CHANGE UP (ref 27–34)
MCHC RBC-ENTMCNC: 31.2 GM/DL — LOW (ref 32–36)
MCV RBC AUTO: 91.3 FL — SIGNIFICANT CHANGE UP (ref 80–100)
MONOCYTES # BLD AUTO: 0.55 K/UL — SIGNIFICANT CHANGE UP (ref 0–0.9)
MONOCYTES NFR BLD AUTO: 8.8 % — SIGNIFICANT CHANGE UP (ref 2–14)
NEUTROPHILS # BLD AUTO: 3.2 K/UL — SIGNIFICANT CHANGE UP (ref 1.8–7.4)
NEUTROPHILS NFR BLD AUTO: 51.5 % — SIGNIFICANT CHANGE UP (ref 43–77)
NRBC # BLD: 0 /100 WBCS — SIGNIFICANT CHANGE UP (ref 0–0)
PLATELET # BLD AUTO: 337 K/UL — SIGNIFICANT CHANGE UP (ref 150–400)
POTASSIUM SERPL-MCNC: 3.5 MMOL/L — SIGNIFICANT CHANGE UP (ref 3.5–5.3)
POTASSIUM SERPL-SCNC: 3.5 MMOL/L — SIGNIFICANT CHANGE UP (ref 3.5–5.3)
PROT SERPL-MCNC: 7.9 G/DL — SIGNIFICANT CHANGE UP (ref 6–8.3)
RBC # BLD: 4.49 M/UL — SIGNIFICANT CHANGE UP (ref 3.8–5.2)
RBC # FLD: 15 % — HIGH (ref 10.3–14.5)
SODIUM SERPL-SCNC: 141 MMOL/L — SIGNIFICANT CHANGE UP (ref 135–145)
TROPONIN I, HIGH SENSITIVITY RESULT: 7.8 NG/L — SIGNIFICANT CHANGE UP
WBC # BLD: 6.22 K/UL — SIGNIFICANT CHANGE UP (ref 3.8–10.5)
WBC # FLD AUTO: 6.22 K/UL — SIGNIFICANT CHANGE UP (ref 3.8–10.5)

## 2024-08-19 PROCEDURE — 85025 COMPLETE CBC W/AUTO DIFF WBC: CPT

## 2024-08-19 PROCEDURE — 71045 X-RAY EXAM CHEST 1 VIEW: CPT | Mod: 26

## 2024-08-19 PROCEDURE — 70450 CT HEAD/BRAIN W/O DYE: CPT | Mod: 26,MC

## 2024-08-19 PROCEDURE — 93005 ELECTROCARDIOGRAM TRACING: CPT

## 2024-08-19 PROCEDURE — 80053 COMPREHEN METABOLIC PANEL: CPT

## 2024-08-19 PROCEDURE — 36415 COLL VENOUS BLD VENIPUNCTURE: CPT

## 2024-08-19 PROCEDURE — 82962 GLUCOSE BLOOD TEST: CPT

## 2024-08-19 PROCEDURE — 71045 X-RAY EXAM CHEST 1 VIEW: CPT

## 2024-08-19 PROCEDURE — 82550 ASSAY OF CK (CPK): CPT

## 2024-08-19 PROCEDURE — 99285 EMERGENCY DEPT VISIT HI MDM: CPT | Mod: 25

## 2024-08-19 PROCEDURE — 84484 ASSAY OF TROPONIN QUANT: CPT

## 2024-08-19 PROCEDURE — 70450 CT HEAD/BRAIN W/O DYE: CPT | Mod: MC

## 2024-08-19 PROCEDURE — 93010 ELECTROCARDIOGRAM REPORT: CPT

## 2024-08-19 PROCEDURE — 99285 EMERGENCY DEPT VISIT HI MDM: CPT

## 2024-08-19 NOTE — ED PROVIDER NOTE - CLINICAL SUMMARY MEDICAL DECISION MAKING FREE TEXT BOX
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 no ac use, no drugs, no smoking, no cp, no nv, no focal weakness, no dysuria, no visual changes, no incontinence, no abd pain. eating and drinking normally.    syncope r/o arrhythmia vs acs vs ic mass vs lytes imbalance. labs, cth, ekg, re-assess

## 2024-08-19 NOTE — ED PROVIDER NOTE - OBJECTIVE STATEMENT
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 no ac use, no drugs, no smoking, no cp, no nv, no focal weakness, no dysuria, no visual changes, no incontinence, no abd pain. eating and drinking normally.

## 2024-08-19 NOTE — ED PROVIDER NOTE - PATIENT PORTAL LINK FT
You can access the FollowMyHealth Patient Portal offered by [facility] by registering at the following website: [email or web address]. By joining [employer]’s FollowMyHealth portal, you will also be able to view your health information using other applications (apps) compatible with our system.